# Patient Record
Sex: MALE | Race: WHITE
[De-identification: names, ages, dates, MRNs, and addresses within clinical notes are randomized per-mention and may not be internally consistent; named-entity substitution may affect disease eponyms.]

---

## 2018-09-04 ENCOUNTER — HOSPITAL ENCOUNTER (OUTPATIENT)
Dept: HOSPITAL 62 - RAD | Age: 58
Discharge: HOME | End: 2018-09-04
Attending: INTERNAL MEDICINE
Payer: MEDICARE

## 2018-09-04 VITALS — SYSTOLIC BLOOD PRESSURE: 161 MMHG | DIASTOLIC BLOOD PRESSURE: 78 MMHG

## 2018-09-04 DIAGNOSIS — Z79.01: ICD-10-CM

## 2018-09-04 DIAGNOSIS — C92.10: Primary | ICD-10-CM

## 2018-09-04 LAB
APTT BLD: 30 SEC (ref 23.5–35.8)
BUN SERPL-MCNC: 21 MG/DL (ref 7–20)
ERYTHROCYTE [DISTWIDTH] IN BLOOD BY AUTOMATED COUNT: 17.4 % (ref 11.5–14)
HCT VFR BLD CALC: 45.6 % (ref 37.9–51)
HGB BLD-MCNC: 14.9 G/DL (ref 13.5–17)
INR PPP: 0.91
MCH RBC QN AUTO: 28.2 PG (ref 27–33.4)
MCHC RBC AUTO-ENTMCNC: 32.8 G/DL (ref 32–36)
MCV RBC AUTO: 86 FL (ref 80–97)
PATH REV BLD -IMP: (no result)
PLATELET # BLD: 184 10^3/UL (ref 150–450)
PROTHROMBIN TIME: 12.7 SEC (ref 11.4–15.4)
RBC # BLD AUTO: 5.31 10^6/UL (ref 4.35–5.55)
WBC # BLD AUTO: 38.2 10^3/UL (ref 4–10.5)

## 2018-09-04 PROCEDURE — 82565 ASSAY OF CREATININE: CPT

## 2018-09-04 PROCEDURE — 84520 ASSAY OF UREA NITROGEN: CPT

## 2018-09-04 PROCEDURE — 85610 PROTHROMBIN TIME: CPT

## 2018-09-04 PROCEDURE — 77012 CT SCAN FOR NEEDLE BIOPSY: CPT

## 2018-09-04 PROCEDURE — 85027 COMPLETE CBC AUTOMATED: CPT

## 2018-09-04 PROCEDURE — 85730 THROMBOPLASTIN TIME PARTIAL: CPT

## 2018-09-04 PROCEDURE — 38221 DX BONE MARROW BIOPSIES: CPT

## 2018-09-04 PROCEDURE — 36415 COLL VENOUS BLD VENIPUNCTURE: CPT

## 2018-09-04 NOTE — RADIOLOGY REPORT (SQ)
EXAM DESCRIPTION:  CT BIOPSY BONE MARROW, NEEDLE; CT NEEDLE PLACEMENT



COMPLETED DATE/TIME:  9/4/2018 11:53 am; 9/4/2018 11:52 am



REASON FOR STUDY:  ELEVATED WHITE BLOOD COUNT C92.10  CHRONIC MYELOID LEUK, BCR/ABL-POSITIVE, NOT ACH
IEVE R D72.829  ELEVATED WHITE BLOOD CELL COUNT, UNSPECIFIED Z79.01  LONG TERM (CURRENT) USE OF ANTIC
OAGULANTS

Chronic myelogenous leukemia



COMPARISON:  None.



TECHNIQUE:  CT guided biopsy of the right iliac crest bone marrow performed with conscious sedation.

CT Fluoroscopy Time: 2.7 seconds

All CT scanners at this facility use dose modulation, iterative reconstruction, and/or weight based d
osing when appropriate to reduce radiation dose to as low as reasonably achievable (ALARA).

[facility]: Dose Right  [facility]: CareDose    [facility]: Dose Right    [facility]: Teradose 4D    [facility]: Smart Technologies



RADIATION DOSE:  mGy.



FINDINGS:  After obtaining informed consent and explaining the risks and benefits of conscious sedati
on,the patient agreed to the procedure. Prior to the procedure, a time out was performed to verify th
e patient's identity and planned procedure.

IV pain control was administered and physician direction by the registered nurse using 75 micrograms 
of fentanyl. Physiologic monitoring was provided before, during, and after sedation. The total pain c
ontrol was 20 minutes.

Documentation face to face time, the performing proceduralist, spent monitoring the patient: 10  sofia
silvia.

Noncontrast CT scanning was performed to localize the percutaneous site for the biopsy approach.

After sterile skin prep and local lidocaine for skin and deep tissue anesthesia, a coaxial biopsy nee
dle was used to obtain a bone marrow aspirate, and a bone marrow core of tissue. The biopsy tissue wa
s received by [facility] lab to be sent out for evaluation. There were no immediate complications.

Pathology is pending at the time of dictation.



IMPRESSION:  CT GUIDED ASPIRATE AND CORE BIOPSY OF THE RIGHT POSTERIOR ILIAC CREST BONE MARROW PERFOR
MED WITHOUT IMMEDIATE COMPLICATION.  PATHOLOGY PENDING.



COMMENT:  Quality :  Final reports for procedures using fluoroscopy that document radiation exp
osure indices, or exposure time and number of fluorographic images (if radiation exposure indices are
 not available)

Patient medication list reviewed: Yes- Quality ID# [id number]:Eligible professional attests to documenting i
n the medical record they obtained, updated, or reviewed the patient's current medications..



TECHNICAL DOCUMENTATION:  JOB ID:  [id number]

Quality ID# 436: Final reports with documentation of one or more dose reduction techniques (e.g., Aut
omated exposure control, adjustment of the mA and/or kV according to patient size, use of iterative r
econstruction technique)

 2011 [employer]- All Rights Reserved



Reading location - IP/workstation name: [facility]

## 2018-09-04 NOTE — RADIOLOGY REPORT (SQ)
EXAM DESCRIPTION:  CT BIOPSY BONE MARROW, NEEDLE; CT NEEDLE PLACEMENT



COMPLETED DATE/TIME:  9/4/2018 11:53 am; 9/4/2018 11:52 am



REASON FOR STUDY:  ELEVATED WHITE BLOOD COUNT C92.10  CHRONIC MYELOID LEUK, BCR/ABL-POSITIVE, NOT ACH
IEVE R D72.829  ELEVATED WHITE BLOOD CELL COUNT, UNSPECIFIED Z79.01  LONG TERM (CURRENT) USE OF ANTIC
OAGULANTS

Chronic myelogenous leukemia



COMPARISON:  None.



TECHNIQUE:  CT guided biopsy of the right iliac crest bone marrow performed with conscious sedation.

CT Fluoroscopy Time: 2.7 seconds

All CT scanners at this facility use dose modulation, iterative reconstruction, and/or weight based d
osing when appropriate to reduce radiation dose to as low as reasonably achievable (ALARA).

[facility]: Dose Right  [facility]: CareDose    [facility]: Dose Right    [facility]: Teradose 4D    [facility]: [employer]



RADIATION DOSE:  mGy.



FINDINGS:  After obtaining informed consent and explaining the risks and benefits of conscious sedati
on,the patient agreed to the procedure. Prior to the procedure, a time out was performed to verify th
e patient's identity and planned procedure.

IV pain control was administered and physician direction by the registered nurse using 75 micrograms 
of fentanyl. Physiologic monitoring was provided before, during, and after sedation. The total pain c
ontrol was 20 minutes.

Documentation face to face time, the performing proceduralist, spent monitoring the patient: 10  sofia
silvia.

Noncontrast CT scanning was performed to localize the percutaneous site for the biopsy approach.

After sterile skin prep and local lidocaine for skin and deep tissue anesthesia, a coaxial biopsy nee
dle was used to obtain a bone marrow aspirate, and a bone marrow core of tissue. The biopsy tissue wa
s received by [facility] lab to be sent out for evaluation. There were no immediate complications.

Pathology is pending at the time of dictation.



IMPRESSION:  CT GUIDED ASPIRATE AND CORE BIOPSY OF THE RIGHT POSTERIOR ILIAC CREST BONE MARROW PERFOR
MED WITHOUT IMMEDIATE COMPLICATION.  PATHOLOGY PENDING.



COMMENT:  Quality :  Final reports for procedures using fluoroscopy that document radiation exp
osure indices, or exposure time and number of fluorographic images (if radiation exposure indices are
 not available)

Patient medication list reviewed: Yes- Quality ID# [id number]:Eligible professional attests to documenting i
n the medical record they obtained, updated, or reviewed the patient's current medications..



TECHNICAL DOCUMENTATION:  JOB ID:  [id number]

Quality ID# 436: Final reports with documentation of one or more dose reduction techniques (e.g., Aut
omated exposure control, adjustment of the mA and/or kV according to patient size, use of iterative r
econstruction technique)

 2011 [employer]- All Rights Reserved



Reading location - IP/workstation name: [facility]